# Patient Record
Sex: MALE | Race: WHITE | NOT HISPANIC OR LATINO | URBAN - METROPOLITAN AREA
[De-identification: names, ages, dates, MRNs, and addresses within clinical notes are randomized per-mention and may not be internally consistent; named-entity substitution may affect disease eponyms.]

---

## 2017-05-23 ENCOUNTER — OFFICE VISIT (OUTPATIENT)
Dept: OPHTHALMOLOGY | Facility: CLINIC | Age: 72
End: 2017-05-23
Attending: OPHTHALMOLOGY
Payer: COMMERCIAL

## 2017-05-23 ENCOUNTER — APPOINTMENT (OUTPATIENT)
Dept: OPHTHALMOLOGY | Facility: CLINIC | Age: 72
End: 2017-05-23
Attending: OPHTHALMOLOGY

## 2017-05-23 DIAGNOSIS — H25.13 SENILE NUCLEAR SCLEROSIS, BILATERAL: ICD-10-CM

## 2017-05-23 DIAGNOSIS — H40.1492 PXG (PSEUDOEXFOLIATION GLAUCOMA), MODERATE STAGE: Primary | ICD-10-CM

## 2017-05-23 PROCEDURE — 92083 EXTENDED VISUAL FIELD XM: CPT | Mod: ZF | Performed by: OPHTHALMOLOGY

## 2017-05-23 PROCEDURE — 99213 OFFICE O/P EST LOW 20 MIN: CPT | Mod: ZF

## 2017-05-23 ASSESSMENT — TONOMETRY
OS_IOP_MMHG: 16
IOP_METHOD: APPLANATION
OD_IOP_MMHG: 10

## 2017-05-23 ASSESSMENT — VISUAL ACUITY
METHOD: SNELLEN - LINEAR
OD_PH_SC: 20/25
OD_SC: 20/30
OS_SC: 20/30

## 2017-05-23 ASSESSMENT — EXTERNAL EXAM - RIGHT EYE: OD_EXAM: NORMAL

## 2017-05-23 ASSESSMENT — CONF VISUAL FIELD
METHOD: COUNTING FINGERS
OD_NORMAL: 1
OS_NORMAL: 1

## 2017-05-23 ASSESSMENT — SLIT LAMP EXAM - LIDS
COMMENTS: NORMAL
COMMENTS: NORMAL

## 2017-05-23 ASSESSMENT — EXTERNAL EXAM - LEFT EYE: OS_EXAM: NORMAL

## 2017-05-23 NOTE — NURSING NOTE
"Chief Complaints and History of Present Illnesses   Patient presents with     Glaucoma Follow Up     VF LE     HPI    Affected eye(s):  Both   Symptoms:     No blurred vision   No decreased vision   No Dryness         Do you have eye pain now?:  No      Comments:  Keeping up with drops \"religiously\". No [facility] changes  Ayana MANUEL May 23, 2017 9:17 AM               "

## 2017-05-23 NOTE — MR AVS SNAPSHOT
After Visit Summary   5/23/2017    Rodolfo Price    MRN: [id number]           Patient Information     Date Of Birth          1945        Visit Information        Provider Department      5/23/2017 10:00 AM Kayla Pratt MD Eye Clinic        Today's Diagnoses     PXG (pseudoexfoliation glaucoma), moderate stage    -  1    Senile nuclear sclerosis, bilateral          Care Instructions    Patient will continue on Latanoprost which is a teal top drop at bedtime in both eyes and Cosopt (Timolol/Dorzolamide) which is a blue top drop 2x/day (12 hours apart) in the left eye only.  Patient will return to clinic in 4-6 months with visual field test (Left eye first), dilated eye exam, IOL master, disc photos and IOP check.     Would recommend cataract surgery with Istent in the left eye if patient develops a visually symptomatic cataract prior to trabeculectomy surgery.          Follow-ups after your visit        Follow-up notes from your care team     Return 4-6 months with visual field test (Left eye first), dilated eye exam, IOL master, disc photos and IO.      Your next 10 appointments already scheduled     May 23, 2017 10:00 AM CDT   RETURN GLAUCOMA with Kayla Pratt MD   Eye Clinic ([facility])    [address]  [address]  [address]  [facility] [id number] [phone]              Who to contact     Please call your clinic at [phone] to:    Ask questions about your health    Make or cancel appointments    Discuss your medicines    Learn about your test results    Speak to your doctor   If you have compliments or concerns about an experience at your clinic, or if you wish to file a complaint, please contact [facility] Patient Relations at [phone] or email us at [email or web address]         Additional Information About Your Visit        MyChart Information     Coeurative is an electronic gateway that  provides easy, online access to your medical records. With Lendio, you can request a clinic appointment, read your test results, renew a prescription or communicate with your care team.     To sign up for Lendio visit the website at [email or web address]   You will be asked to enter the access code listed below, as well as some personal information. Please follow the directions to create your username and password.     Your access code is: [email or web address]  Expires: 2017  6:30 AM     Your access code will  in 90 days. If you need help or a new code, please contact your [facility] or call [phone] for assistance.        Care EveryWhere ID     This is your Care EveryWhere ID. This could be used by other organizations to access your [place] medical records  [id number]         Blood Pressure from Last 3 Encounters:   No data found for BP    Weight from Last 3 Encounters:   No data found for Wt              We Performed the Following     OVF 24-2 Dynamic OS        Primary Care Provider    None Specified       No primary provider on file.        Thank you!     Thank you for choosing EYE CLINIC  for your care. Our goal is always to provide you with excellent care. Hearing back from our patients is one way we can continue to improve our services. Please take a few minutes to complete the written survey that you may receive in the mail after your visit with us. Thank you!             Your Updated Medication List - Protect others around you: Learn how to safely use, store and throw away your medicines at www.disposemymeds.org.          This list is accurate as of: 17  9:56 AM.  Always use your most recent med list.                   Brand Name Dispense Instructions for use    dorzolamide-timolol 2-0.5 % ophthalmic solution    COSOPT     Place 1 drop Into the left eye 2 times daily       latanoprost 0.005 % ophthalmic solution    XALATAN     Place 1 drop into both eyes  At Bedtime

## 2017-05-23 NOTE — PROGRESS NOTES
1)PXG OS>OD -- first started on gtts in 2013, lost to f/u from 9809-8428 [facility] -- K pachy: 613/634   Tmax: OD:Hteens and OS:26 per [facility] notes at [facility]   HVF: OD:Full and OS:Inferior arcuate fairly stable from 1262-1895     CDR:0.3/0.85     HRT/OCT: OD:RNFL WNL and OS:Mod-Severe RNFL thinning  (with prog noted from 1040-0660 per [facility] at [facility])     FHX of Glc: No     Gonio:  open     Intolerant to:      Asthma/COPD:  No, tolerating topical BB Steroid Use: No     Kidney Stones: No     Sulfa Allergy:  No    IOP targets:OD:Hteens-L20s and OS:L-Mteens -- IOP good on new medication regimen -- rec adding BrP OS BID prior to SLT prior to phaco/trab or phaco/IstentOS  2)S/p Lasik ?OS only per patient and ?OD only per [facility] notes from [facility]   3)NS OU -- following [facility] the [facility]    Patient will continue on Latanoprost which is a teal top drop at bedtime in both eyes and Cosopt (Timolol/Dorzolamide) which is a blue top drop 2x/day (12 hours apart) in the left eye only.  Patient will return to clinic in 4-6 months with visual field test (Left eye first), dilated eye exam, IOL master, disc photos and IOP check.     Would recommend cataract surgery with Istent in the left eye if patient develops a visually symptomatic cataract prior to trabeculectomy surgery.

## 2017-11-29 ENCOUNTER — TELEPHONE (OUTPATIENT)
Dept: OPHTHALMOLOGY | Facility: CLINIC | Age: 72
End: 2017-11-29

## 2017-11-29 NOTE — TELEPHONE ENCOUNTER
No Tuesday appt available in December  Pt unable to come in on a Thursday or Friday    Scheduled 4-6 month f/u with dr. walsh in early January    Pt will need approval [facility]     Will forward to financial counselor to work on approval before appt    Pt satisfied with date/time  Douglas Xie RN 2:26 PM 11/29/17

## 2017-11-29 NOTE — TELEPHONE ENCOUNTER
----- Message from Batool Sloan sent at 11/29/2017 12:20 PM CST -----  Regarding: pt requesting an eye exam, pressure check, FV with Dr. Pratt before the end...  Contact: [phone]  of the year.  Dr. Pratt's first available is not until Jan 2018.  Also pt is only available on Tuesdays and Wednesdays to come into clinic.    Pt is coming in via kabuku.  Would you please call pt at [phone] to help him with his request?    Thank you!  Xu WARD    Please DO NOT send this message and/or reply back to sender.  Call Center Representatives DO NOT respond to messages.

## 2017-12-14 ENCOUNTER — TELEPHONE (OUTPATIENT)
Dept: OPHTHALMOLOGY | Facility: CLINIC | Age: 72
End: 2017-12-14

## 2017-12-14 NOTE — TELEPHONE ENCOUNTER
Dorz/timolol one drop left eye 2/day sent to [facility] pharmacy per request  Hard copy faxed 12-14-17  Expedite written on Rx  Douglas Xie RN 10:27 AM 12/14/17

## 2017-12-14 NOTE — TELEPHONE ENCOUNTER
----- Message from Bandar Angeles sent at 12/14/2017  8:52 AM CST -----  Regarding: Pt Called For Expedited Rx  Contact: [phone]  Pt of Dr. Pratt called to requested an Expedited Rx sent for Dorzolamide.     Pt has already contacted his Pharmacy, [facility]. Has been out of Drops for the past 1 1/2 months. Pt requested when faxing request to put the word 'Expedite' on Cover Letter of the fax.     Pt did not have fax/phone number available at this time. I Googled [facility]    Pharmacy   Room [address] [place] [address]    Phone: [phone]    Please call Pt with any questions at [phone]    Thank you,  Aaron  Call Center    Please DO NOT send message and or reply back to sender. Call center Representatives DO NOT respond to Messages.

## 2018-01-02 ENCOUNTER — TELEPHONE (OUTPATIENT)
Dept: OPHTHALMOLOGY | Facility: CLINIC | Age: 73
End: 2018-01-02

## 2018-01-02 ENCOUNTER — OFFICE VISIT (OUTPATIENT)
Dept: OPHTHALMOLOGY | Facility: CLINIC | Age: 73
End: 2018-01-02
Attending: OPHTHALMOLOGY
Payer: COMMERCIAL

## 2018-01-02 DIAGNOSIS — H40.1492 PSEUDOEXFOLIATION GLAUCOMA, MODERATE STAGE: Primary | ICD-10-CM

## 2018-01-02 DIAGNOSIS — H35.9 RETINAL LESION: ICD-10-CM

## 2018-01-02 DIAGNOSIS — H25.13 SENILE NUCLEAR SCLEROSIS, BILATERAL: ICD-10-CM

## 2018-01-02 PROCEDURE — 92083 EXTENDED VISUAL FIELD XM: CPT | Mod: ZF | Performed by: OPHTHALMOLOGY

## 2018-01-02 PROCEDURE — 92250 FUNDUS PHOTOGRAPHY W/I&R: CPT | Mod: ZF | Performed by: OPHTHALMOLOGY

## 2018-01-02 PROCEDURE — 76519 ECHO EXAM OF EYE: CPT | Mod: ZF | Performed by: OPHTHALMOLOGY

## 2018-01-02 PROCEDURE — G0463 HOSPITAL OUTPT CLINIC VISIT: HCPCS | Mod: ZF

## 2018-01-02 RX ORDER — DORZOLAMIDE HYDROCHLORIDE AND TIMOLOL MALEATE 20; 5 MG/ML; MG/ML
1 SOLUTION/ DROPS OPHTHALMIC 2 TIMES DAILY
Qty: 1 BOTTLE | Refills: 11 | Status: SHIPPED | OUTPATIENT
Start: 2018-01-02

## 2018-01-02 RX ORDER — LATANOPROST 50 UG/ML
1 SOLUTION/ DROPS OPHTHALMIC AT BEDTIME
Qty: 1 BOTTLE | Refills: 11 | Status: SHIPPED | OUTPATIENT
Start: 2018-01-02

## 2018-01-02 ASSESSMENT — VISUAL ACUITY
METHOD: SNELLEN - LINEAR
OS_SC+: -1
OS_SC: 20/60
OD_PH_SC: 20/25
OS_PH_SC: 20/50+/-
OD_SC: 20/30
OD_SC+: -1

## 2018-01-02 ASSESSMENT — CONF VISUAL FIELD
OD_NORMAL: 1
OS_NORMAL: 1
METHOD: COUNTING FINGERS

## 2018-01-02 ASSESSMENT — EXTERNAL EXAM - RIGHT EYE: OD_EXAM: NORMAL

## 2018-01-02 ASSESSMENT — SLIT LAMP EXAM - LIDS
COMMENTS: NORMAL
COMMENTS: NORMAL

## 2018-01-02 ASSESSMENT — TONOMETRY
IOP_METHOD: APPLANATION
OD_IOP_MMHG: 13
OS_IOP_MMHG: 19

## 2018-01-02 ASSESSMENT — CUP TO DISC RATIO
OS_RATIO: 0.85
OD_RATIO: 0.4

## 2018-01-02 ASSESSMENT — EXTERNAL EXAM - LEFT EYE: OS_EXAM: NORMAL

## 2018-01-02 NOTE — TELEPHONE ENCOUNTER
[facility] is [facility].  I spoke with Rainer (F/C) and he is waiting for the new 6 month referral to come in, that would cover appointments, testing, etc.  Need to schedule testing after referral is in place.

## 2018-01-02 NOTE — NURSING NOTE
Chief Complaints and History of Present Illnesses   Patient presents with     Follow Up For     PXG OS>OD      HPI    Affected eye(s):  Both   Symptoms:     No blurred vision   No decreased vision         Do you have eye pain now?:  No      Comments:  Pt states the vision in his left eye was blurry for about a week.  States medication got delayed at the pharmacy for about a week. This happened about 3 weeks ago.  Katherine MANUEL 10:08 AM January 2, 2018

## 2018-01-02 NOTE — PROGRESS NOTES
1)PXG OS>OD -- first started on gtts in 2013, lost to f/u from 9811-9252 [facility] -- K pachy: 613/634   Tmax: OD:Hteens and OS:26 per [facility] notes at [facility]   HVF: OD:Full and OS:Inferior arcuate fairly stable from 3111-5666     CDR:0.3/0.85     HRT/OCT: OD:RNFL WNL and OS:Mod-Severe RNFL thinning  (with prog noted from 3403-5087 per [facility] at [facility])     FHX of Glc: No     Gonio:  open     Intolerant to:      Asthma/COPD:  No, tolerating topical BB Steroid Use: No     Kidney Stones: No     Sulfa Allergy:  No    IOP targets:OD:Hteens-L20s and OS:L-Mteens -- IOP good on new medication regimen -- rec adding BrP OS BID prior to SLT prior to phaco/trab or phaco/IstentOS  2)S/p Lasik ?OS only per patient and ?OD only per [facility] from [facility]   3)NS OU -- following at the [facility]  4)Astimatism OS -- need pentacam prior to CE    MD:Patient reports being off drops for 1 month 2/2 pharmacy issues    Patient will continue on Latanoprost which is a teal top drop at bedtime in both eyes and Cosopt (Timolol/Dorzolamide) which is a blue top drop 2x/day (12 hours apart) in the left eye only.  Patient will return to clinic in 1 month with refraction prior to considering cataract surgery.  Patient will also obtain Pentacam corneal measurements prior to his repeat clinic visit.         Would recommend cataract surgery with Istent in the left eye if patient develops a visually symptomatic cataract prior to trabeculectomy surgery.      Resident Note:  Patient notices decreased vision left eye, slowly progressive.  Visual field stable  Recommend CE/IOL + iStent OD  Recommend optos/AF photos both eyes for yellow retinal lesions    Lamont Flores MD  PGY3, Dept of Ophthalmology  Pager [phone]    Attending Physician Attestation:  Complete documentation of historical and exam elements from today's encounter can be found in the full encounter summary report (not reduplicated in this progress note). I personally obtained the chief  complaint(s) and history of present illness.  I confirmed and edited as necessary the review of systems, past medical/surgical history, family history, social history, and examination findings as documented by others; and I examined the patient myself. I personally reviewed the relevant tests, images, and reports as documented above. I formulated and edited as necessary the assessment and plan and discussed the findings and management plan with the patient and family.  - Kayla Pratt MD

## 2018-01-02 NOTE — MR AVS SNAPSHOT
After Visit Summary   1/2/2018    Rodolfo Price    MRN: [id number]           Patient Information     Date Of Birth          1945        Visit Information        Provider Department      1/2/2018 10:00 AM Kayla Pratt MD Eye Clinic        Today's Diagnoses     Pseudoexfoliation glaucoma, moderate stage    -  1    Retinal lesion        Senile nuclear sclerosis, bilateral          Care Instructions    Patient will continue on Latanoprost which is a teal top drop at bedtime in both eyes and Cosopt (Timolol/Dorzolamide) which is a blue top drop 2x/day (12 hours apart) in the left eye only.  Patient will return to clinic in 1 month with refraction prior to considering cataract surgery.  Patient will also obtain Pentacam corneal measurements prior to his repeat clinic visit.          Follow-ups after your visit        Follow-up notes from your care team     Return 1 month with refraction prior to considering cataract surgery..      Who to contact     Please call your clinic at [phone] to:    Ask questions about your health    Make or cancel appointments    Discuss your medicines    Learn about your test results    Speak to your doctor   If you have compliments or concerns about an experience at your clinic, or if you wish to file a complaint, please contact [facility] Patient Relations at [phone] or email us at [email or web address]         Additional Information About Your Visit        MyChart Information     nTAG Interactive is an electronic gateway that provides easy, online access to your medical records. With nTAG Interactive, you can request a clinic appointment, read your test results, renew a prescription or communicate with your care team.     To sign up for Sient visit the website at [email or web address]   You will be asked to enter the access code listed below, as well as some personal information. Please follow the directions to create your  username and password.     Your access code is: [email or web address]  Expires: 3/19/2018  6:31 AM     Your access code will  in 90 days. If you need help or a new code, please contact your [facility] or call [phone] for assistance.        Care EveryWhere ID     This is your Care EveryWhere ID. This could be used by other organizations to access your [place] medical records  [id number]         Blood Pressure from Last 3 Encounters:   No data found for BP    Weight from Last 3 Encounters:   No data found for Wt              We Performed the Following     Fundus Photos OU (both eyes)     IOL Biometry w/ IOL calc OU (both eye)     OVF 24-2 Dynamic OU     [place]-Operative Worksheet (Glaucoma)          Where to get your medicines      Some of these will need a paper prescription and others can be bought over the counter.  Ask your nurse if you have questions.     Bring a paper prescription for each of these medications     dorzolamide-timolol 2-0.5 % ophthalmic solution    latanoprost 0.005 % ophthalmic solution          Primary Care Provider Fax #    Provider Not In System [phone]                Equal Access to Services     OLGA GAVIN : Hadii daquan sanders Soanabella, waaxda luchavez, qaybta kaalmalindsay akins, alejandra hudson. So [facility] [phone].    ATENCIÓN: Si habla español, tiene a newman disposición servicios gratuitos de asistencia lingüística. Llame al [phone].    We comply with applicable federal civil rights laws and [place] laws. We do not discriminate on the basis of race, color, national origin, age, disability, sex, sexual orientation, or gender identity.            Thank you!     Thank you for choosing EYE CLINIC  for your care. Our goal is always to provide you with excellent care. Hearing back from our patients is one way we can continue to improve our services. Please take a few minutes to complete the written survey that you may  receive in the mail after your visit with us. Thank you!             Your Updated Medication List - Protect others around you: Learn how to safely use, store and throw away your medicines at www.disposemymeds.org.          This list is accurate as of: 1/2/18 12:32 PM.  Always use your most recent med list.                   Brand Name Dispense Instructions for use Diagnosis    dorzolamide-timolol 2-0.5 % ophthalmic solution    COSOPT    1 Bottle    Place 1 drop Into the left eye 2 times daily    Pseudoexfoliation glaucoma, moderate stage       latanoprost 0.005 % ophthalmic solution    XALATAN    1 Bottle    Place 1 drop into both eyes At Bedtime    Pseudoexfoliation glaucoma, moderate stage

## 2018-02-06 ENCOUNTER — OFFICE VISIT (OUTPATIENT)
Dept: OPHTHALMOLOGY | Facility: CLINIC | Age: 73
End: 2018-02-06
Attending: OPHTHALMOLOGY
Payer: COMMERCIAL

## 2018-02-06 DIAGNOSIS — H40.1493 PXG (PSEUDOEXFOLIATION GLAUCOMA), SEVERE STAGE: Primary | ICD-10-CM

## 2018-02-06 DIAGNOSIS — H25.13 SENILE NUCLEAR SCLEROSIS, BILATERAL: ICD-10-CM

## 2018-02-06 PROCEDURE — 92015 DETERMINE REFRACTIVE STATE: CPT | Mod: ZF

## 2018-02-06 PROCEDURE — G0463 HOSPITAL OUTPT CLINIC VISIT: HCPCS | Mod: ZF

## 2018-02-06 ASSESSMENT — VISUAL ACUITY
OS_BAT_MED: 20/60
OS_SC: 20/50
OS_SC+: +1
OS_PH_SC: 20/25
METHOD: SNELLEN - LINEAR
OD_BAT_MED: 20/30
OD_SC+: -1
OD_SC: 20/25

## 2018-02-06 ASSESSMENT — EXTERNAL EXAM - RIGHT EYE: OD_EXAM: NORMAL

## 2018-02-06 ASSESSMENT — REFRACTION_MANIFEST
OD_ADD: +2.50
OD_CYLINDER: +1.00
OD_AXIS: 170
OS_CYLINDER: +0.50
OS_AXIS: 160
OS_SPHERE: -1.75
OD_SPHERE: -0.75
OS_ADD: +2.50

## 2018-02-06 ASSESSMENT — TONOMETRY
IOP_METHOD: APPLANATION
OS_IOP_MMHG: 19
OD_IOP_MMHG: 12
OD_IOP_MMHG: 18
IOP_METHOD: APPLANATION
OS_IOP_MMHG: 11

## 2018-02-06 ASSESSMENT — CONF VISUAL FIELD
OD_NORMAL: 1
OS_NORMAL: 1

## 2018-02-06 ASSESSMENT — EXTERNAL EXAM - LEFT EYE: OS_EXAM: NORMAL

## 2018-02-06 ASSESSMENT — PACHYMETRY
OD_CT(UM): 613
OS_CT(UM): 634

## 2018-02-06 ASSESSMENT — SLIT LAMP EXAM - LIDS
COMMENTS: NORMAL
COMMENTS: NORMAL

## 2018-02-06 NOTE — PROGRESS NOTES
1)PXG OS>OD -- first started on gtts in 2013, lost to f/u from 7201-6452 [facility] -- K pachy: 613/634   Tmax: OD:Hteens and OS:26 per [facility] at [facility]   HVF: OD:Full and OS:Inferior arcuate fairly stable from 4243-1654     CDR:0.3/0.85     HRT/OCT: OD:RNFL WNL and OS:Mod-Severe RNFL thinning  (with prog noted from 3153-4054 [facility] at [facility])     FHX of Glc: No     Gonio:  open     Intolerant to:      Asthma/COPD:  No, tolerating topical BB Steroid Use: No     Kidney Stones: No     Sulfa Allergy:  No    IOP targets:OD:Hteens-L20s and OS:L-Mteens -- IOP good on new medication regimen -- rec adding BrP OS BID prior to SLT prior to phaco/trab or phaco/IstentOS  2)S/p Lasik ?OS only per patient and ?OD only [facility] -- Pentacam from [facility] obtained  3)NS OU -- [facility]  -- visually significant by glare testing -- rec phaoc/Istent prior to trab -- pt wants to try glasses first prior to considering surgery  4)Astimatism OS -- Pentacam from [facility] obtained      Patient will continue on Latanoprost which is a teal top drop at bedtime in both eyes and Cosopt (Timolol/Dorzolamide) which is a blue top drop 2x/day (12 hours apart) in the left eye only.  A long discussion of the risks, benefits, and alternatives including potential treatment and management options were had with patient and a decision was made to try glasses before considering cataract/Istent surgery on the left eye.  Patient will return to clinic in 3-4 month with visual field test (LEFT EYE ONLY), OCT with RNFL analysis.         Attending Physician Attestation:  Complete documentation of historical and exam elements from today's encounter can be found in the full encounter summary report (not reduplicated in this progress note). I personally obtained the chief complaint(s) and history of present illness.  I confirmed and edited as necessary the review of systems, past medical/surgical history, family history, social history, and  examination findings as documented by others; and I examined the patient myself. I personally reviewed the relevant tests, images, and reports as documented above. I formulated and edited as necessary the assessment and plan and discussed the findings and management plan with the patient and family.  - Kayla Pratt MD

## 2018-02-06 NOTE — NURSING NOTE
Chief Complaints and History of Present Illnesses   Patient presents with     Glaucoma Follow Up     HPI    Last Eye Exam:  1/2/18   Affected eye(s):  Both   Symptoms:     Blurred vision      Duration:  1 month   Frequency:  Constant       Do you have eye pain now?:  No      Comments:  Pt returns for 1 month follow up. Would like to discuss findings from other apt with the other clinic, MD please review Pentacam scan with pt. Vision is a bit blurry. No problems using current drops and last drop used this morning. BISMARK BURNETTE, COA 2:49 PM 02/06/2018

## 2018-02-06 NOTE — MR AVS SNAPSHOT
After Visit Summary   2/6/2018    Rodolfo Price    MRN: [id number]           Patient Information     Date Of Birth          1945        Visit Information        Provider Department      2/6/2018 1:45 PM Kayla Pratt MD Eye Clinic        Today's Diagnoses     Pxg (pseudoexfoliation glaucoma), severe stage    -  1      Care Instructions    Patient will continue on Latanoprost which is a teal top drop at bedtime in both eyes and Cosopt (Timolol/Dorzolamide) which is a blue top drop 2x/day (12 hours apart) in the left eye only.  A long discussion of the risks, benefits, and alternatives including potential treatment and management options were had with patient and a decision was made to try glasses before considering cataract/Istent surgery on the left eye.  Patient will return to clinic in 3-4 month with visual field test (LEFT EYE ONLY), OCT with RNFL analysis.          Follow-ups after your visit        Follow-up notes from your care team     Return  3-4 month with visual field test (LEFT EYE ONLY), OCT with RNFL analysis.      Your next 10 appointments already scheduled     May 15, 2018 10:00 AM CDT   RETURN GLAUCOMA with Kayla Pratt MD   Eye Clinic ([facility])    [address] [place] [id number] [phone]              Who to contact     Please call your clinic at [phone] to:    Ask questions about your health    Make or cancel appointments    Discuss your medicines    Learn about your test results    Speak to your doctor   If you have compliments or concerns about an experience at your clinic, or if you wish to file a complaint, please contact [facility] Patient Relations at [phone] or email us at [email or web address]         Additional Information About Your Visit        Queralthart Information     NeuroVista is an electronic gateway that provides easy, online  access to your medical records. With HealthUnlocked, you can request a clinic appointment, read your test results, renew a prescription or communicate with your care team.     To sign up for HealthUnlocked visit the website at [email or web address]   You will be asked to enter the access code listed below, as well as some personal information. Please follow the directions to create your username and password.     Your access code is: [email or web address]  Expires: 3/19/2018  6:31 AM     Your access code will  in 90 days. If you need help or a new code, please contact your [facility] or call [phone] for assistance.        Care EveryWhere ID     This is your Care EveryWhere ID. This could be used by other organizations to access your [place] medical records  [id number]         Blood Pressure from Last 3 Encounters:   No data found for BP    Weight from Last 3 Encounters:   No data found for Wt              Today, you had the following     No orders found for display       Primary Care Provider Fax #    Provider Not In System [phone]                Equal Access to Services     [facility]: Hadii aad sharon hadasho Soomaali, waaxda luqadaha, qaybta kaalmada adeegyalindsay, alejandra grossman . So [facility] [phone].    ATENCIÓN: Si habla español, tiene a newman disposición servicios gratuitos de asistencia lingüística. Llame al [phone].    We comply with applicable federal civil rights laws and [place] laws. We do not discriminate on the basis of race, color, national origin, age, disability, sex, sexual orientation, or gender identity.            Thank you!     Thank you for choosing EYE CLINIC  for your care. Our goal is always to provide you with excellent care. Hearing back from our patients is one way we can continue to improve our services. Please take a few minutes to complete the written survey that you may receive in the mail after your visit with us. Thank  you!             Your Updated Medication List - Protect others around you: Learn how to safely use, store and throw away your medicines at www.disposemymeds.org.          This list is accurate as of 2/6/18  4:44 PM.  Always use your most recent med list.                   Brand Name Dispense Instructions for use Diagnosis    dorzolamide-timolol 2-0.5 % ophthalmic solution    COSOPT    1 Bottle    Place 1 drop Into the left eye 2 times daily    Pseudoexfoliation glaucoma, moderate stage       latanoprost 0.005 % ophthalmic solution    XALATAN    1 Bottle    Place 1 drop into both eyes At Bedtime    Pseudoexfoliation glaucoma, moderate stage

## 2018-02-06 NOTE — PATIENT INSTRUCTIONS
Patient will continue on Latanoprost which is a teal top drop at bedtime in both eyes and Cosopt (Timolol/Dorzolamide) which is a blue top drop 2x/day (12 hours apart) in the left eye only.  A long discussion of the risks, benefits, and alternatives including potential treatment and management options were had with patient and a decision was made to try glasses before considering cataract/Istent surgery on the left eye.  Patient will return to clinic in 3-4 month with visual field test (LEFT EYE ONLY), OCT with RNFL analysis.

## 2022-07-07 NOTE — PATIENT INSTRUCTIONS
Patient will continue on Latanoprost which is a teal top drop at bedtime in both eyes and Cosopt (Timolol/Dorzolamide) which is a blue top drop 2x/day (12 hours apart) in the left eye only.  Patient will return to clinic in 4-6 months with visual field test (Left eye first), dilated eye exam, IOL master, disc photos and IOP check.     Would recommend cataract surgery with Istent in the left eye if patient develops a visually symptomatic cataract prior to trabeculectomy surgery.     Yes

## 2023-01-13 NOTE — PATIENT INSTRUCTIONS
Patient will continue on Latanoprost which is a teal top drop at bedtime in both eyes and Cosopt (Timolol/Dorzolamide) which is a blue top drop 2x/day (12 hours apart) in the left eye only.  Patient will return to clinic in 1 month with refraction prior to considering cataract surgery.  Patient will also obtain Pentacam corneal measurements prior to his repeat clinic visit.   Yes...